# Patient Record
Sex: FEMALE | Race: WHITE | URBAN - METROPOLITAN AREA
[De-identification: names, ages, dates, MRNs, and addresses within clinical notes are randomized per-mention and may not be internally consistent; named-entity substitution may affect disease eponyms.]

---

## 2021-03-03 ENCOUNTER — IMPORTED ENCOUNTER (OUTPATIENT)
Dept: URBAN - METROPOLITAN AREA CLINIC 50 | Facility: CLINIC | Age: 46
End: 2021-03-03

## 2021-03-08 ENCOUNTER — IMPORTED ENCOUNTER (OUTPATIENT)
Dept: URBAN - METROPOLITAN AREA CLINIC 50 | Facility: CLINIC | Age: 46
End: 2021-03-08

## 2021-03-08 NOTE — PATIENT DISCUSSION
"""Educated patient on today's findings. Right sided Bell's palsy.  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 ""

## 2021-03-22 ENCOUNTER — IMPORTED ENCOUNTER (OUTPATIENT)
Dept: URBAN - METROPOLITAN AREA CLINIC 50 | Facility: CLINIC | Age: 46
End: 2021-03-22

## 2021-03-22 NOTE — PATIENT DISCUSSION
"""Educated patient on today's findings.  Improving right sided Bell's palsy (patient able to forcefully ""

## 2021-04-17 ASSESSMENT — VISUAL ACUITY
OS_CC: J10
OD_CC: J10
OD_PH: 20/80
OD_PH: 20/100-
OD_CC: 20/400
OS_PH: 20/NI
OS_CC: 20/50-+
OS_CC: 20/50-
OD_CC: 20/100

## 2021-04-17 ASSESSMENT — TONOMETRY
OD_IOP_MMHG: 13
OS_IOP_MMHG: 13
OS_IOP_MMHG: 14
OD_IOP_MMHG: 14

## 2021-05-03 ENCOUNTER — 1 MONTH FOLLOW-UP (OUTPATIENT)
Dept: URBAN - METROPOLITAN AREA CLINIC 48 | Facility: CLINIC | Age: 46
End: 2021-05-03

## 2021-05-03 ENCOUNTER — PREPPED CHART (OUTPATIENT)
Dept: URBAN - METROPOLITAN AREA CLINIC 48 | Facility: CLINIC | Age: 46
End: 2021-05-03

## 2021-05-03 DIAGNOSIS — G51.0: ICD-10-CM

## 2021-05-03 DIAGNOSIS — H04.121: ICD-10-CM

## 2021-05-03 PROCEDURE — 92012 INTRM OPH EXAM EST PATIENT: CPT

## 2021-05-03 ASSESSMENT — TONOMETRY
OS_IOP_MMHG: 14
OD_IOP_MMHG: 16

## 2021-05-03 ASSESSMENT — VISUAL ACUITY
OS_CC: 20/40-1
OD_CC: 20/50-1

## 2021-05-03 NOTE — PATIENT DISCUSSION
Improving very well. Educated patient on today's findings. Improving right sided Bell's palsy (patient able to blink eyelid 75%). Significantly improved exposure keratopathy OD. Improved VA OD today compared to last examination. Patient compliant with AT.  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 Patient has started chemo on 4/30/20 and will be receiving chemo treatment every Friday.  Patient had radiation a few weeks ago and may have radiation again depending on effectiveness of chemo. Patient states most recent MRI showed improvement in size of tumor. Patient states she was told nerve weakness likely related to brain tumor. Patient had MRI 2/2021 and full stroke work up in 2/2021. Previous OCT RNFL and Macula WNL OU. RTC 3-4 weeks for RV Advised to RTC sooner if increased pain, redness, visual changes, or diplopia. Continue Preservative free tears right eye every 1hours , [place] Preservative Free, Thera Tears, Blink, Refresh. Continue Warm compresses both eyes twice a day , x 10 minutes. Continue Ocusoft Lid Scrubs both eyes twice a day , after warm compress. Continue Erythromycin Ointment right eye at bedtime. Continue Gel drops right eye throughout the day , and at night.

## 2021-05-03 NOTE — PATIENT DISCUSSION
Educated patient on today's findings. Improving right sided Bell's palsy (patient able to forcefully blink eyelid half closed). 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 Patient compliant with AT. Patient has not been taping eyelid or wearing patch at night. Educated patient on importance of protecting eye while sleeping or outisde due to risk of exposure keratopathy, ulceration, infection and loss of vision. Gave patient information on tape that may not cause as much skin irritation. Patient expressed understanding. 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 Patient started radiation 2 weeks ago and has last dose of radiation next week. Patient will be starting chemo next week. Patient states she was told nerve weakness likely related to brain tumor. Patient had MRI 2/2021 and full stroke work up in 2/2021. Previous OCT RNFL and Macula WNL OU. RTC 1 month for RV Advised to RTC sooner if increased pain, redness, visual changes, or diplopia. Continue Preservative free tears right eye every 1hours , [place] Preservative Free, Thera Tears, Blink, Refresh. Continue Warm compresses both eyes twice a day , x 10 minutes. Continue Ocusoft Lid Scrubs both eyes twice a day , after warm compress. Continue Erythromycin Ointment right eye at bedtime. Continue Gel drops right eye throughout the day , and at night.

## 2021-05-03 NOTE — PATIENT DISCUSSION
Discussed dry eye diagnosis with patient. Patient unable to tape lid at night due to skin sensitivity 2Â° radiation. Recommend patient reattempt using eye patch for sleep or tape the best she can at night. Educated patient on proper lid hygiene and stressed importance of lid massages and the use of warm compresses and artificial tears. Continue Preservative free tears right eye every 1hour , [place] Preservative Free, Thera Tears, Blink, Refresh. Continue Warm compresses both eyes twice a day , x 10 minutes. Continue Ocusoft Lid Scrubs both eyes twice a day , after warm compress. Continue Erythromycin Ointment right eye at bedtime. Continue Gel drops right eye throughout the day , and at night.

## 2021-05-03 NOTE — PATIENT DISCUSSION
Discussed dry eye diagnosis with patient. Patient unable to tape lid at night due to skin sensitivity due to radiation. Recommend patient reattempt using eye patch for sleep or tape the best she can at night. Educated patient on proper lid hygiene and stressed importance of lid massages and the use of warm compresses and artificial tears. Continue Preservative free tears right eye every 1hour , [place] Preservative Free, Thera Tears, Blink, Refresh. Continue Warm compresses both eyes twice a day , x 10 minutes. Continue Ocusoft Lid Scrubs both eyes twice a day , after warm compress. Continue Erythromycin Ointment right eye at bedtime. Continue Gel drops right eye throughout the day , and at night.